# Patient Record
Sex: MALE | Race: WHITE | URBAN - METROPOLITAN AREA
[De-identification: names, ages, dates, MRNs, and addresses within clinical notes are randomized per-mention and may not be internally consistent; named-entity substitution may affect disease eponyms.]

---

## 2024-02-20 ENCOUNTER — TELEPHONE (OUTPATIENT)
Age: 44
End: 2024-02-20

## 2024-02-20 NOTE — TELEPHONE ENCOUNTER
L/m requesting c/b regarding the 4 open referrals from Dec.   Is patient wanting to follow thru with any/all? Or close them for now?

## 2024-02-21 ENCOUNTER — TELEPHONE (OUTPATIENT)
Age: 44
End: 2024-02-21

## 2024-02-21 NOTE — TELEPHONE ENCOUNTER
Patient called questioning why he had a new patient appt on 2/28/24 even though he was already here for a visit in December. I advised him that was just an acute not for new patient. He did not want to come in fo new patient appt next week stated \"I would rather kill two birds with one stone and have my new patient appt next time I am sick\" I advised him he needs a new patient appt. He wanted me to move it to 5/8/24. Appt moved just wanted you to know.

## 2024-05-01 ENCOUNTER — OFFICE VISIT (OUTPATIENT)
Age: 44
End: 2024-05-01
Payer: COMMERCIAL

## 2024-05-01 VITALS
WEIGHT: 233 LBS | BODY MASS INDEX: 31.56 KG/M2 | HEIGHT: 72 IN | SYSTOLIC BLOOD PRESSURE: 142 MMHG | DIASTOLIC BLOOD PRESSURE: 92 MMHG | HEART RATE: 68 BPM

## 2024-05-01 DIAGNOSIS — N48.89 PENILE PAIN: Primary | ICD-10-CM

## 2024-05-01 DIAGNOSIS — R35.1 NOCTURIA: ICD-10-CM

## 2024-05-01 DIAGNOSIS — M54.9 MUSCULOSKELETAL BACK PAIN: ICD-10-CM

## 2024-05-01 LAB
BILIRUBIN, POC: NORMAL
BLOOD URINE, POC: NORMAL
CLARITY, POC: CLEAR
COLOR, POC: YELLOW
GLUCOSE URINE, POC: NORMAL
KETONES, POC: NORMAL
LEUKOCYTE EST, POC: NORMAL
NITRITE, POC: NORMAL
PH, POC: NORMAL
PROTEIN, POC: NORMAL
SPECIFIC GRAVITY, POC: NORMAL
UROBILINOGEN, POC: NORMAL

## 2024-05-01 PROCEDURE — 81003 URINALYSIS AUTO W/O SCOPE: CPT | Performed by: SPECIALIST

## 2024-05-01 PROCEDURE — 99203 OFFICE O/P NEW LOW 30 MIN: CPT | Performed by: SPECIALIST

## 2024-05-01 NOTE — PROGRESS NOTES
Hosea Chow MD [facility] Urology Consultation / New Patient Visit    Patient:  Gabo Damon  YOB: 1980  Date: 5/1/2024    HISTORY OF PRESENT ILLNESS:   The patient is a 43 y.o. male who presents today for evaluation of the following problems:   The patient presents with intermittent pain at the deep, dorsal aspect of his penis during intercourse.  He denies any obstructive voiding symptoms, pain or blood with ejaculation, ED.  He denies any symptoms of Peyronie's disease and there is no palpable abnormality on PE.  He states that this began after his 2022 bilateral vasectomy and subsequently developed a large right scrotal hematoma.  He does have chronic low back pain and this might represent referred pain to his testicle which may be elicited by the physical positioning during sex.  He is scheduled for a MRI of his lumbar spine soon.  He may also be in the pre-clinical (prior to getting curvature) inflammatory phase of Peyronie's disease.  In either case, patient given reassurance and told to use NSAID's as needed.    Lower urinary tract symptoms: nocturia, 1 times per night   Today's AUA Symptom Score (QOL): 1 (5)  (Patient's old records have been requested, reviewed and summarized in today's note: 12/18/23 office note of John Streeter MD)    Summary of old records:   Penile pain, dorsal deep base s/p 2022 bilateral vasectomy (Demetrius)  Chronic lower back pain, referred pain?  Scheduled for MRI lumbar spine  Early preclinical Peyronie's disease     Procedures Today: N/A    Urinalysis today:  Results for POC orders placed in visit on 05/01/24   POCT Urinalysis No Micro (Auto)   Result Value Ref Range    Color, UA yellow     Clarity, UA clear     Glucose, UA POC neg     Bilirubin, UA      Ketones, UA      Spec Grav, UA      Blood, UA POC neg     pH, UA      Protein, UA POC trace     Urobilinogen, UA      Leukocytes, UA neg     Nitrite, UA neg        Last several

## 2024-05-07 ENCOUNTER — HOSPITAL ENCOUNTER (OUTPATIENT)
Dept: MRI IMAGING | Age: 44
Discharge: HOME | End: 2024-05-09
Attending: ORTHOPAEDIC SURGERY
Payer: COMMERCIAL

## 2024-05-07 DIAGNOSIS — M47.816 LUMBAR SPONDYLOSIS: ICD-10-CM

## 2024-05-07 PROCEDURE — 72148 MRI LUMBAR SPINE W/O DYE: CPT

## 2024-05-08 ENCOUNTER — OFFICE VISIT (OUTPATIENT)
Age: 44
End: 2024-05-08

## 2024-05-08 VITALS
WEIGHT: 229 LBS | BODY MASS INDEX: 31.02 KG/M2 | DIASTOLIC BLOOD PRESSURE: 80 MMHG | SYSTOLIC BLOOD PRESSURE: 140 MMHG | HEIGHT: 72 IN | OXYGEN SATURATION: 99 % | HEART RATE: 68 BPM

## 2024-05-08 DIAGNOSIS — Q76.49 BERTOLOTTI'S SYNDROME: ICD-10-CM

## 2024-05-08 DIAGNOSIS — Z11.4 SCREENING FOR HIV (HUMAN IMMUNODEFICIENCY VIRUS): ICD-10-CM

## 2024-05-08 DIAGNOSIS — G89.29 CHRONIC RIGHT-SIDED LOW BACK PAIN WITHOUT SCIATICA: ICD-10-CM

## 2024-05-08 DIAGNOSIS — Z11.59 NEED FOR HEPATITIS C SCREENING TEST: ICD-10-CM

## 2024-05-08 DIAGNOSIS — M54.50 CHRONIC RIGHT-SIDED LOW BACK PAIN WITHOUT SCIATICA: ICD-10-CM

## 2024-05-08 DIAGNOSIS — Z00.00 WELL ADULT EXAM: Primary | ICD-10-CM

## 2024-05-08 SDOH — HEALTH STABILITY: PHYSICAL HEALTH: ON AVERAGE, HOW MANY MINUTES DO YOU ENGAGE IN EXERCISE AT THIS LEVEL?: 20 MIN

## 2024-05-08 SDOH — HEALTH STABILITY: PHYSICAL HEALTH: ON AVERAGE, HOW MANY DAYS PER WEEK DO YOU ENGAGE IN MODERATE TO STRENUOUS EXERCISE (LIKE A BRISK WALK)?: 2 DAYS

## 2024-05-08 ASSESSMENT — PATIENT HEALTH QUESTIONNAIRE - PHQ9
2. FEELING DOWN, DEPRESSED OR HOPELESS: NOT AT ALL
1. LITTLE INTEREST OR PLEASURE IN DOING THINGS: NOT AT ALL
SUM OF ALL RESPONSES TO PHQ QUESTIONS 1-9: 0
SUM OF ALL RESPONSES TO PHQ9 QUESTIONS 1 & 2: 0
SUM OF ALL RESPONSES TO PHQ QUESTIONS 1-9: 0

## 2024-05-09 ASSESSMENT — ENCOUNTER SYMPTOMS
SORE THROAT: 0
VOMITING: 0
SHORTNESS OF BREATH: 0
BACK PAIN: 1
DIARRHEA: 0
RHINORRHEA: 0
NAUSEA: 0
CHEST TIGHTNESS: 0
CONSTIPATION: 0

## 2024-05-09 NOTE — PROGRESS NOTES
rate and regular rhythm.      Heart sounds: Normal heart sounds. No murmur heard.  Pulmonary:      Effort: Pulmonary effort is normal. No respiratory distress.      Breath sounds: Normal breath sounds. No wheezing.   Abdominal:      General: Bowel sounds are normal. There is no distension.      Palpations: Abdomen is soft.      Tenderness: There is no abdominal tenderness. There is no guarding or rebound.   Musculoskeletal:         General: No swelling, tenderness or deformity. Normal range of motion.      Cervical back: Normal range of motion.      Comments: No reproducible back pain on palpation no midline spine tenderness.  Normal gait.  Negative straight leg raise bilaterally.   Lymphadenopathy:      Cervical: No cervical adenopathy.   Skin:     General: Skin is warm and dry.      Capillary Refill: Capillary refill takes less than 2 seconds.      Findings: No rash.   Neurological:      General: No focal deficit present.      Mental Status: He is alert and oriented to person, place, and time.      Sensory: No sensory deficit.      Motor: No weakness.      Gait: Gait normal.      Comments: No obvious focal neurologic deficits.  5 out of 5 strength and sensation in the upper and lower extremities.   Psychiatric:         Mood and Affect: Mood normal.         Behavior: Behavior normal.         Thought Content: Thought content normal.         Judgment: Judgment normal.           ASSESSMENT/PLAN     1. Well adult exam  -     TSH with Reflex; Future  -     Lipid Panel; Future  -     Comprehensive Metabolic Panel; Future  -     CBC with Auto Differential; Future  -     Microalbumin, Ur; Future  -     Hepatitis C Antibody; Future  -     HIV Screen; Future  2. Chronic right-sided low back pain without sciatica  -     Ana Rogel MD, Pain Management, [facility]  3. Bertolotti's syndrome  -     Ana Rogel MD, [facility], [facility]  4. Screening for HIV (human immunodeficiency virus)  -     HIV

## 2024-05-24 ENCOUNTER — INITIAL CONSULT (OUTPATIENT)
Dept: PAIN MANAGEMENT | Age: 44
End: 2024-05-24
Payer: COMMERCIAL

## 2024-05-24 VITALS — WEIGHT: 229 LBS | BODY MASS INDEX: 31.02 KG/M2 | HEIGHT: 72 IN

## 2024-05-24 DIAGNOSIS — M51.36 DEGENERATION OF LUMBAR INTERVERTEBRAL DISC: ICD-10-CM

## 2024-05-24 DIAGNOSIS — M47.817 LUMBOSACRAL SPONDYLOSIS WITHOUT MYELOPATHY: Primary | ICD-10-CM

## 2024-05-24 PROCEDURE — 99204 OFFICE O/P NEW MOD 45 MIN: CPT | Performed by: PAIN MEDICINE

## 2024-05-24 RX ORDER — MELOXICAM 7.5 MG/1
7.5 TABLET ORAL DAILY
COMMUNITY
Start: 2024-05-09

## 2024-05-24 RX ORDER — TIZANIDINE 2 MG/1
2 TABLET ORAL 3 TIMES DAILY
COMMUNITY
Start: 2024-05-09

## 2024-05-24 ASSESSMENT — ENCOUNTER SYMPTOMS
BACK PAIN: 1
BOWEL INCONTINENCE: 0

## 2024-05-24 NOTE — PROGRESS NOTES
HPI:     Back Pain  This is a chronic problem. The current episode started more than 1 year ago. The problem occurs constantly. The problem is unchanged. The pain is present in the lumbar spine. The quality of the pain is described as aching. The pain does not radiate. The pain is moderate. The pain is The same all the time. The symptoms are aggravated by bending, twisting, standing and position. Stiffness is present At night. Pertinent negatives include no bladder incontinence or bowel incontinence. He has tried bed rest, home exercises, heat, ice, walking, muscle relaxant, NSAIDs and chiropractic manipulation for the symptoms.     Chronic nonradiating low back pain.  MRI lumbar spine degenerative changes.  Reports he has seen spine surgeon.  Has been doing physical therapy.  Continues to have lingering pain.    Pain ranges from a 2/10 to a 10/10 depending on activity.    Patient denies any new neurological symptoms. No bowel or bladder incontinence, no weakness, and no falling.    Review of [facility] does not show any aberrant prescription behavior.     Past Medical History:   Diagnosis Date    Back pain        Past Surgical History:   Procedure Laterality Date    VESICOSTOMY         No Known Allergies      Current Outpatient Medications:     meloxicam (MOBIC) 7.5 MG tablet, Take 1 tablet by mouth daily, Disp: , Rfl:     tiZANidine (ZANAFLEX) 2 MG tablet, Take 1 tablet by mouth 3 times daily, Disp: , Rfl:     No family history on file.    Social History     Socioeconomic History    Marital status:      Spouse name: Not on file    Number of children: Not on file    Years of education: Not on file    Highest education level: Not on file   Occupational History    Not on file   Tobacco Use    Smoking status: Never    Smokeless tobacco: Never   Vaping Use    Vaping Use: Never used   Substance and Sexual Activity    Alcohol use: Yes     Comment: 3 week    Drug use: Not on file    Sexual activity: Yes     Partners: